# Patient Record
Sex: FEMALE | Race: WHITE
[De-identification: names, ages, dates, MRNs, and addresses within clinical notes are randomized per-mention and may not be internally consistent; named-entity substitution may affect disease eponyms.]

---

## 2019-03-06 ENCOUNTER — HOSPITAL ENCOUNTER (INPATIENT)
Dept: HOSPITAL 74 - FM/S | Age: 64
LOS: 2 days | Discharge: HOME HEALTH | DRG: 470 | End: 2019-03-08
Attending: ORTHOPAEDIC SURGERY | Admitting: ORTHOPAEDIC SURGERY
Payer: COMMERCIAL

## 2019-03-06 VITALS — BODY MASS INDEX: 35.6 KG/M2

## 2019-03-06 DIAGNOSIS — M17.11: Primary | ICD-10-CM

## 2019-03-06 DIAGNOSIS — M21.161: ICD-10-CM

## 2019-03-06 DIAGNOSIS — G47.33: ICD-10-CM

## 2019-03-06 DIAGNOSIS — M06.9: ICD-10-CM

## 2019-03-06 PROCEDURE — 0SRC0J9 REPLACEMENT OF RIGHT KNEE JOINT WITH SYNTHETIC SUBSTITUTE, CEMENTED, OPEN APPROACH: ICD-10-PCS | Performed by: ORTHOPAEDIC SURGERY

## 2019-03-06 RX ADMIN — ACETAMINOPHEN SCH MG: 325 TABLET ORAL at 11:45

## 2019-03-06 RX ADMIN — OXYCODONE HYDROCHLORIDE AND ACETAMINOPHEN SCH MG: 500 TABLET ORAL at 21:35

## 2019-03-06 RX ADMIN — ASPIRIN 325 MG ORAL TABLET SCH MG: 325 PILL ORAL at 21:35

## 2019-03-06 RX ADMIN — DOCUSATE SODIUM,SENNOSIDES SCH TABLET: 50; 8.6 TABLET, FILM COATED ORAL at 21:35

## 2019-03-06 RX ADMIN — OXYCODONE HYDROCHLORIDE SCH MG: 10 TABLET, FILM COATED, EXTENDED RELEASE ORAL at 21:35

## 2019-03-06 RX ADMIN — ACETAMINOPHEN SCH MG: 325 TABLET ORAL at 18:05

## 2019-03-06 RX ADMIN — ACETAMINOPHEN SCH: 325 TABLET ORAL at 17:26

## 2019-03-06 RX ADMIN — CEFAZOLIN SODIUM SCH MLS/HR: 2 SOLUTION INTRAVENOUS at 18:05

## 2019-03-06 NOTE — SURG
Surgery First Assist Note


First Assist: Darian Bella PA-C


Date of Service: 03/06/19


Diagnosis: 





Right knee DJD


Procedure: 





Right total knee arthroplasty


I was present for the entirety of the operative procedure. For further detail, 

please refer to operative report.








Visit type





- Case Type


Case Type: Scheduled





- New patient


This patient is new to me today: Yes


Date on this admission: 03/06/19

## 2019-03-06 NOTE — CONSULT
Consultation: 


REQUESTING PROVIDER: Dr. Landis





CONSULT REQUEST: We have been asked to medically evaluate this patient post-

operatively.





HISTORY OF PRESENT ILLNESS:


64 year-old female with a PMH significant for ROSELIA and RA not on medications; s/

p right knee arthroplasty earlier today with Dr. Landis.








REVIEW OF SYSTEMS:


CONSTITUTIONAL: 


Absent:  fever, chills, diaphoresis, generalized weakness, malaise, loss of 

appetite, weight change


HEENT: 


Absent:  rhinorrhea, nasal congestion, throat pain, throat swelling, difficulty 

swallowing, mouth swelling, ear pain, eye pain, visual changes


CARDIOVASCULAR: 


Absent: chest pain, syncope, palpitations, irregular heart rate, lightheadedness

, peripheral edema


RESPIRATORY: 


Absent: cough, shortness of breath, dyspnea with exertion, orthopnea, wheezing, 

stridor, hemoptysis


GASTROINTESTINAL:


Absent: abdominal pain, abdominal distension, nausea, vomiting, diarrhea, 

constipation, melena, hematochezia


GENITOURINARY: 


Absent: dysuria, frequency, urgency, hesitancy, hematuria, flank pain, genital 

pain


MUSCULOSKELETAL: 


+right knee pain


Absent: myalgia, arthralgia, joint swelling, back pain, neck pain


SKIN: 


Absent: rash, itching, pallor


HEMATOLOGIC/IMMUNOLOGIC: 


Absent: easy bleeding, easy bruising, lymphadenopathy, frequent infections


ENDOCRINE:


Absent: unexplained weight gain, unexplained weight loss, heat intolerance, 

cold intolerance


NEUROLOGIC: 


Absent: headache, focal weakness or paresthesias, dizziness, unsteady gait, 

seizure, mental status changes, bladder or bowel incontinence


PSYCHIATRIC: 


Absent: anxiety, depression, suicidal or homicidal ideation, hallucinations.





PHYSICAL EXAMINATION





 Vital Signs - 24 hr











  03/06/19 03/06/19 03/06/19





  06:49 10:53 11:00


 


Temperature 98 F 98 F 


 


Pulse Rate 74 75 76


 


Respiratory 18 16 17





Rate   


 


Blood Pressure 143/85 133/72 138/82


 


O2 Sat by Pulse   98





Oximetry (%)   














GENERAL: Awake, alert, and fully oriented, in no acute distress.


HEAD: Normal with no signs of trauma.


LUNGS: Breath sounds equal, clear to auscultation bilaterally. No wheezes, and 

no crackles. No accessory muscle use.


HEART: Regular rate and rhythm, normal S1 and S2 without murmur, rub or gallop.


ABDOMEN: Soft, nontender, not distended


UPPER EXTREMITIES: 2+ pulses, warm, well-perfused. No cyanosis. No clubbing. 

Cap refill <2 seconds. No peripheral edema.


LOWER EXTREMITIES: SCDs, TEDs, surgical dressing c/d/i, Hemovac drain; +flex/+

ext toes bilaterally, sensory intact


NEUROLOGICAL:  Cranial nerves II-XII intact. Normal speech.








                           Active Medications











Generic Name Dose Route Start Last Admin





  Trade Name Freq  PRN Reason Stop Dose Admin


 


Acetaminophen  650 mg  03/06/19 18:00  





  Tylenol -  PO  03/09/19 10:14  





  Q6H AARON   





     





     





     





     


 


Al Hydroxide/Mg Hydroxide  30 ml  03/06/19 10:48  





  Mylanta Oral Suspension -  PO   





  Q4H PRN   





  DYSPEPSIA   





     





     





     


 


Ascorbic Acid  500 mg  03/06/19 22:00  





  Vitamin C -  PO   





  BID [facility]   





     





     





     





     


 


Aspirin  325 mg  03/06/19 22:00  





  Asa -  PO   





  BID [facility]   





     





     





     





     


 


Cholecalciferol  2,000 unit  03/07/19 10:00  





  Vitamin D3 -  PO   





  DAILY [facility]   





     





     





     





     


 


Fentanyl  50 mcg  03/06/19 10:04  





  Sublimaze Injection -  IVPUSH   





  [id number] PRN   





  PAIN-PACU ORDER X 4 DOSES ONLY   





     





     





     


 


Lactated Ringer's  1,000 mls @ 125 mls/hr  03/06/19 10:15  





  Lactated Ringers Solution  IV   





  ASDIR AARON   





     





     





     





     


 


Cefazolin Sodium/Dextrose  2 gm in 50 mls @ 100 mls/hr  03/06/19 18:00  





  Ancef 2 Gm Premixed Ivpb -  IVPB  03/07/19 02:29  





  Q8H-IV AARON   





     





     





     





     


 


Lactated Ringer's  1,000 mls @ 125 mls/hr  03/06/19 11:00  





  Lactated Ringers Solution  IV  03/07/19 06:00  





  ASDIR AARON   





     





     





     





     


 


Magnesium Hydroxide  30 ml  03/06/19 10:48  





  Milk Of Magnesia -  PO   





  PRN PRN   





  CONSTIPATION   





     





     





     


 


Multivitamins/Minerals/Vitamin C  1 tab  03/07/19 10:00  





  Tab-A-Vit -  PO   





  DAILY [facility]   





     





     





     





     


 


Non-Formulary Medication  1 each  03/06/19 10:47  





  Ibuprofen/Famotidine [Duexis 800-26.6 Mg Tablet]  PO   





  ASDIR PRN   





  PAIN   





     





     





     


 


Ondansetron HCl  4 mg  03/06/19 10:48  





  Zofran Injection  IVPUSH   





  Q6H PRN   





  NAUSEA   





     





     





     


 


Oxycodone HCl  5 mg  03/06/19 10:04  





  Roxicodone -  PO   





  Q3H PRN   





  PAIN LEVEL 1-5   





     





     





     


 


Oxycodone HCl  10 mg  03/06/19 10:04  





  Roxicodone -  PO   





  Q3H PRN   





  PAIN LEVEL 6-10   





     





     





     


 


Oxycodone HCl  10 mg  03/06/19 22:00  





  Oxycontin -  PO  03/09/19 10:05  





  BID AARON   





     





     





     





     


 


Pantoprazole Sodium  40 mg  03/07/19 10:00  





  Protonix -  PO   





  DAILY [facility]ON   





     





     





     





     


 


Promethazine HCl  12.5 mg  03/06/19 10:04  





  Phenergan Injection -  IVPUSH   





  Q6H PRN   





  NAUSEA-FOR RESCUE AFTER 15 MIN   





     





     





     


 


Senna/Docusate Sodium  2 tablet  03/06/19 22:00  





  Pericolace -  PO   





  BID [facility]   





     





     





     





     











ASSESSMENT/PLAN:


64 year-old female with a PMH significant for ROSELIA and RA. s/p total right knee 

replacement.





Right knee arthroplasty


                --POD #0


 --perioperative antibiotics per surgery


 --pain management per surgery


 --ASA 325mg BID


 --protonix 


 --bowel regimen


 --incentive spirometry


 --Hemovac drain, monitor output





Rheumatoid arthritis


   --contrary to indication in paper chart, patient is not on methotrexate; on 

no meds





ROSELIA


   --uses home CPAP


   --respiratory to set up for tonight





FEN


 Fluids: LR@125mL/hr


 Electrolytes: replete as indicated


 Nutrition: regular diet





DVT prophylaxis: OOB, ambulation, SCDs, TEDs, ASA 325mg BID





Physical therapy





Dispo: We will continue to follow the patient. Thank you for this consultative 

opportunity.











Visit type





- Emergency Visit


Emergency Visit: Yes


ED Registration Date: 03/06/19


Care time: The patient presented to the Emergency Department on the above date 

and was hospitalized for further evaluation of their emergent condition.





- New Patient


This patient is new to me today: Yes


Date on this admission: 03/06/19





- Critical Care


Critical Care patient: No

## 2019-03-07 LAB
ANION GAP SERPL CALC-SCNC: 8 MMOL/L (ref 8–16)
BUN SERPL-MCNC: 17 MG/DL (ref 7–18)
CALCIUM SERPL-MCNC: 8.7 MG/DL (ref 8.5–10)
CHLORIDE SERPL-SCNC: 102 MMOL/L (ref 98–107)
CO2 SERPL-SCNC: 25 MMOL/L (ref 21–32)
CREAT SERPL-MCNC: 0.7 MG/DL (ref 0.55–1.3)
DEPRECATED RDW RBC AUTO: 12.5 % (ref 11.6–15.6)
GLUCOSE SERPL-MCNC: 119 MG/DL (ref 74–106)
HCT VFR BLD CALC: 34.8 % (ref 32.4–45.2)
HGB BLD-MCNC: 11.7 GM/DL (ref 10.7–15.3)
MCH RBC QN AUTO: 30.3 PG (ref 25.7–33.7)
MCHC RBC AUTO-ENTMCNC: 33.7 G/DL (ref 32–36)
MCV RBC: 90 FL (ref 80–96)
PLATELET # BLD AUTO: 243 K/MM3 (ref 134–434)
PMV BLD: 7.8 FL (ref 7.5–11.1)
POTASSIUM SERPLBLD-SCNC: 3.9 MMOL/L (ref 3.5–5.1)
RBC # BLD AUTO: 3.87 M/MM3 (ref 3.6–5.2)
SODIUM SERPL-SCNC: 135 MMOL/L (ref 136–145)
WBC # BLD AUTO: 8.9 K/MM3 (ref 4–10.8)

## 2019-03-07 RX ADMIN — PANTOPRAZOLE SODIUM SCH MG: 40 TABLET, DELAYED RELEASE ORAL at 09:36

## 2019-03-07 RX ADMIN — ACETAMINOPHEN SCH MG: 325 TABLET ORAL at 00:00

## 2019-03-07 RX ADMIN — MULTIVITAMIN TABLET SCH TAB: TABLET at 09:36

## 2019-03-07 RX ADMIN — OXYCODONE HYDROCHLORIDE SCH MG: 10 TABLET, FILM COATED, EXTENDED RELEASE ORAL at 21:11

## 2019-03-07 RX ADMIN — ACETAMINOPHEN SCH MG: 325 TABLET ORAL at 19:04

## 2019-03-07 RX ADMIN — OXYCODONE HYDROCHLORIDE AND ACETAMINOPHEN SCH MG: 500 TABLET ORAL at 21:11

## 2019-03-07 RX ADMIN — ASPIRIN 325 MG ORAL TABLET SCH MG: 325 PILL ORAL at 21:11

## 2019-03-07 RX ADMIN — DOCUSATE SODIUM,SENNOSIDES SCH TABLET: 50; 8.6 TABLET, FILM COATED ORAL at 21:12

## 2019-03-07 RX ADMIN — DOCUSATE SODIUM,SENNOSIDES SCH TABLET: 50; 8.6 TABLET, FILM COATED ORAL at 09:35

## 2019-03-07 RX ADMIN — CEFAZOLIN SODIUM SCH MLS/HR: 2 SOLUTION INTRAVENOUS at 02:00

## 2019-03-07 RX ADMIN — ASPIRIN 325 MG ORAL TABLET SCH MG: 325 PILL ORAL at 09:35

## 2019-03-07 RX ADMIN — ACETAMINOPHEN SCH MG: 325 TABLET ORAL at 12:00

## 2019-03-07 RX ADMIN — VITAMIN D, TAB 1000IU (100/BT) SCH UNIT: 25 TAB at 09:36

## 2019-03-07 RX ADMIN — OXYCODONE HYDROCHLORIDE SCH MG: 10 TABLET, FILM COATED, EXTENDED RELEASE ORAL at 09:35

## 2019-03-07 RX ADMIN — OXYCODONE HYDROCHLORIDE AND ACETAMINOPHEN SCH MG: 500 TABLET ORAL at 09:36

## 2019-03-07 RX ADMIN — ACETAMINOPHEN SCH MG: 325 TABLET ORAL at 06:32

## 2019-03-07 NOTE — PN
Progress Note (short form)





- Note


Progress Note: 





POD#1





Pt without any nausea, emesis. No CP/SOB. Had some pain overnight relieved with 

medications. 





 Vital Signs











 Period  Temp  Pulse  Resp  BP Sys/Jain  Pulse Ox


 


 Last 24 Hr  97.4 F-98.4 F  68-86  16-18  114-138/55-86  93-98








Hemovac:265 bloody





GEN: A&0x3, NAD. OOB to chair.


CV: RRR


Lungs: CTA b/l anterioly


Right knee: dressing/ace wrap c/d/i. 4/5 dorsi/plantar flexion


Left le/5/ dorsi/plantar flexion. SCD in place no calf tenderness or 

swelling noted.





CBC/CHEM pending





A/p: 65 yo female s/p Right TKR, POD #1


   Pain management and mobilization today. OOB with assistance PT


   Diet as tolerated


   DVT ppx with SCD/mobilization/Aspirin 325mg BID


   Pian managment with oxycontin/oxycodone/tylenol


   Stool softners for constipation


   D/w Dr. Landis

## 2019-03-07 NOTE — PN
Physical Exam: 


SUBJECTIVE: Patient seen and examined at bedside. Just back from PT, feels it 

went well. Pain is well-managed.








OBJECTIVE:





 Vital Signs











 Period  Temp  Pulse  Resp  BP Sys/Jain  Pulse Ox


 


 Last 24 Hr  97.4 F-98.4 F  68-86  16-18  114-138/55-86  93-98











GENERAL: Awake, alert, and fully oriented, in no acute distress.


HEAD: Normal with no signs of trauma.


LUNGS: Breath sounds equal, clear to auscultation bilaterally. No wheezes, and 

no crackles. No accessory muscle use.


HEART: Regular rate and rhythm, normal S1 and S2 without murmur, rub or gallop.


ABDOMEN: Soft, nontender, not distended


UPPER EXTREMITIES: 2+ pulses, warm, well-perfused. No cyanosis. No clubbing. 

Cap refill <2 seconds. No peripheral edema.


LOWER EXTREMITIES: SCDs, TEDs, surgical dressing c/d/i, Hemovac drain; +flex/+

ext toes bilaterally, sensory intact


NEUROLOGICAL:  Cranial nerves II-XII intact. Normal speech.














                           Active Medications











Generic Name Dose Route Start Last Admin





  Trade Name Freq  PRN Reason Stop Dose Admin


 


Acetaminophen  650 mg  03/06/19 18:00  03/07/19 06:32





  Tylenol -  PO  03/09/19 10:14  650 mg





  Q6H AARON   Administration





     





     





     





     


 


Al Hydroxide/Mg Hydroxide  30 ml  03/06/19 10:48  





  Mylanta Oral Suspension -  PO   





  Q4H PRN   





  DYSPEPSIA   





     





     





     


 


Ascorbic Acid  500 mg  03/06/19 22:00  03/06/19 21:35





  Vitamin C -  PO   500 mg





  BID AARON   Administration





     





     





     





     


 


Aspirin  325 mg  03/06/19 22:00  03/06/19 21:35





  Asa -  PO   325 mg





  BID AARON   Administration





     





     





     





     


 


Cholecalciferol  2,000 unit  03/07/19 10:00  





  Vitamin D3 -  PO   





  DAILY [facility]ON   





     





     





     





     


 


Fentanyl  50 mcg  03/06/19 10:04  





  Sublimaze Injection -  IVPUSH   





  [id number] PRN   





  PAIN-PACU ORDER X 4 DOSES ONLY   





     





     





     


 


Lactated Ringer's  1,000 mls @ 125 mls/hr  03/06/19 10:15  03/06/19 17:26





  Lactated Ringers Solution  IV   Not Given





  ASDIR AARON   





     





     





     





     


 


Magnesium Hydroxide  30 ml  03/06/19 10:48  





  Milk Of Magnesia -  PO   





  PRN PRN   





  CONSTIPATION   





     





     





     


 


Multivitamins/Minerals/Vitamin C  1 tab  03/07/19 10:00  





  Tab-A-Vit -  PO   





  DAILY [facility]ON   





     





     





     





     


 


Non-Formulary Medication  1 each  03/06/19 10:47  





  Ibuprofen/Famotidine [Duexis 800-26.6 Mg Tablet]  PO   





  ASDIR PRN   





  PAIN   





     





     





     


 


Ondansetron HCl  4 mg  03/06/19 10:48  





  Zofran Injection  IVPUSH   





  Q6H PRN   





  NAUSEA   





     





     





     


 


Oxycodone HCl  5 mg  03/06/19 10:04  





  Roxicodone -  PO   





  Q3H PRN   





  PAIN LEVEL 1-5   





     





     





     


 


Oxycodone HCl  10 mg  03/06/19 10:04  03/07/19 05:12





  Roxicodone -  PO   10 mg





  Q3H PRN   Administration





  PAIN LEVEL 6-10   





     





     





     


 


Oxycodone HCl  10 mg  03/06/19 22:00  03/06/19 21:35





  Oxycontin -  PO  03/09/19 10:05  10 mg





  BID AARON   Administration





     





     





     





     


 


Pantoprazole Sodium  40 mg  03/07/19 10:00  





  Protonix -  PO   





  DAILY [facility]ARON   





     





     





     





     


 


Promethazine HCl  12.5 mg  03/06/19 10:04  





  Phenergan Injection -  IVPUSH   





  Q6H PRN   





  NAUSEA-FOR RESCUE AFTER 15 MIN   





     





     





     


 


Senna/Docusate Sodium  2 tablet  03/06/19 22:00  03/06/19 21:35





  Pericolace -  PO   2 tablet





  BID AARON   Administration





     





     





     





     











ASSESSMENT/PLAN:


64 year-old female with a PMH significant for ROSELIA and RA. s/p total right knee 

replacement.





Right knee arthroplasty


                --POD #1


 --perioperative antibiotics complete


 --pain management per surgery


 --ASA 325mg BID


 --protonix 


 --bowel regimen


 --incentive spirometry


 --Hemovac drain, monitor output





Rheumatoid arthritis


   --contrary to indication in paper chart, patient is not on methotrexate; on 

no meds





ROSELIA


   --CPAP at night


   


FEN


 Fluids: PO intake adequate


 Electrolytes: replete as indicated


 Nutrition: regular diet





DVT prophylaxis: OOB, ambulation, SCDs, TEDs, ASA 325mg BID





Physical therapy





Dispo: We will continue to follow the patient. Thank you for this consultative 

opportunity.








Visit type





- Emergency Visit


Emergency Visit: No





- New Patient


This patient is new to me today: Yes


Date on this admission: 03/07/19





- Critical Care


Critical Care patient: No

## 2019-03-07 NOTE — OP
DATE OF OPERATION:  03/06/2019

 

SURGEON:  Sherwin Landis M.D. 

 

ASSISTANT:  MINDA Boyd 

 

PREOPERATIVE DIAGNOSIS:  Right tricompartmental osteoarthritis knee.  With fixed

flexion deformity by 5 degrees and fixed varus deformity of 20 degrees.

 

POSTOPERATIVE DIAGNOSIS:  Right tricompartmental osteoarthritis knee.  With fixed

flexion deformity by 5 degrees and fixed varus deformity of 20 degrees.

 

OPERATION PERFORMED:  Right posterior stabilized total knee arthroplasty ([place]).  

 

ANESTHESIA:  Conscious sedation.  Spinal anesthesia with peripheral nerve block.  

 

OPERATION IN DETAILS:  The patient was correctly identified, brought in operating

room.  Timeout was called.  2 g Kefzol, 1 g vancomycin given preoperatively.  Patient

in the supine position, the entire limb free draped.  Midline incision was utilized. 

Incision was created and the quadriceps tendon was split longitudinally.  A medial

parapatellar incision was made down to the medial aspect of the tibial tubercle.  The

patella was first cut from patella ligament to quadriceps tendon in accordance with

[place]'s line, and the jig size setting was for a 27-mm patella button.  The soft

tissue envelope was dissected sharply off the proximal medial tibia.  A blunt hammer

was placed behind the tibia.  The tibia cut was made neutral and 90 degrees to the

tibial shaft.  It must be noted, this was difficult, because the actual tibia itself

was bowed and this called for clinical judgment where to seat the jig.  The femoral

jig was uncomplicated.  This utilized the appropriate jig settings for 10 mm distal

femoral cut, 2 mm proximalization of the joint line. The jig was set to 3 degrees of

external rotation and 4 degrees of valgus.  Sizing of the components was a Triathlon

posterior stabilized baseplate size 5 that was for the tibia.  The femur measured

size 4, and the flexion, extension gaps were even at 13 mm, so the ultimate

polyethylene utilized was a 5 x 13 mm component.  The jig cuts were made in

accordance to the principles as outlined above.  The femoral cut was made with the

appropriate jig systems, and the flexion and  extension gaps were measured once the

femoral and tibial cuts were made.  Trialing components revealed excellent

positioning of those respective sizes, bringing the knee into full extension and full

range of movement with no patella subluxation and thus normal tracking.  So complete

stability at 13 mm until the coronal sagittal plane was identified.  Once this had

been completed, the cuts were all made appropriately.  The cementing was one stage to

receive the above mentioned sizes.  This included the patella.  All extraneous cement

was removed.  Closure quadriceps tendon fascia 1 Vicryl, subcutaneous 1 and 2-0

Vicryl, skin 3-0 Monocryl with Steri-Strips, drainage one-eighth inch x1.  Prior to

the actual closure, the wounds were thoroughly lavaged, and all extraneous cement had

been removed.   

 

 

MD GEORGINA Amador/[id number]

DD: 03/06/2019 18:03

DT: 03/07/2019 00:41

Job #:  [id number]

## 2019-03-08 VITALS — TEMPERATURE: 98.1 F | HEART RATE: 95 BPM | SYSTOLIC BLOOD PRESSURE: 152 MMHG | DIASTOLIC BLOOD PRESSURE: 74 MMHG

## 2019-03-08 LAB
DEPRECATED RDW RBC AUTO: 12.6 % (ref 11.6–15.6)
HCT VFR BLD CALC: 33 % (ref 32.4–45.2)
HGB BLD-MCNC: 11 GM/DL (ref 10.7–15.3)
MCH RBC QN AUTO: 29.9 PG (ref 25.7–33.7)
MCHC RBC AUTO-ENTMCNC: 33.4 G/DL (ref 32–36)
MCV RBC: 89.7 FL (ref 80–96)
PLATELET # BLD AUTO: 206 K/MM3 (ref 134–434)
PMV BLD: 7.8 FL (ref 7.5–11.1)
RBC # BLD AUTO: 3.68 M/MM3 (ref 3.6–5.2)
WBC # BLD AUTO: 9.2 K/MM3 (ref 4–10.8)

## 2019-03-08 RX ADMIN — ASPIRIN 325 MG ORAL TABLET SCH MG: 325 PILL ORAL at 10:06

## 2019-03-08 RX ADMIN — PANTOPRAZOLE SODIUM SCH MG: 40 TABLET, DELAYED RELEASE ORAL at 10:06

## 2019-03-08 RX ADMIN — VITAMIN D, TAB 1000IU (100/BT) SCH UNIT: 25 TAB at 10:06

## 2019-03-08 RX ADMIN — ACETAMINOPHEN SCH: 325 TABLET ORAL at 00:31

## 2019-03-08 RX ADMIN — OXYCODONE HYDROCHLORIDE AND ACETAMINOPHEN SCH MG: 500 TABLET ORAL at 10:07

## 2019-03-08 RX ADMIN — MULTIVITAMIN TABLET SCH TAB: TABLET at 10:06

## 2019-03-08 RX ADMIN — OXYCODONE HYDROCHLORIDE SCH MG: 10 TABLET, FILM COATED, EXTENDED RELEASE ORAL at 10:07

## 2019-03-08 RX ADMIN — ACETAMINOPHEN SCH MG: 325 TABLET ORAL at 06:41

## 2019-03-08 RX ADMIN — DOCUSATE SODIUM,SENNOSIDES SCH TABLET: 50; 8.6 TABLET, FILM COATED ORAL at 10:07

## 2019-03-08 RX ADMIN — ACETAMINOPHEN SCH MG: 325 TABLET ORAL at 15:14

## 2019-03-08 NOTE — DS
Physical Exam: 


SUBJECTIVE:Patient seen and examined s/p right TKR patient seen and examined at 

bedside with no complaints. Patient states she has been OOB and ambulating with 

assistance and walker. She is tolerating her diet and voiding. She denies any CP

, SOB, N/V/D Fever or chills.








OBJECTIVE:


 Vital Signs











Temp  98.1 F   03/08/19 09:39


 


Pulse  95 H  03/08/19 09:39


 


Resp  18   03/08/19 09:39


 


BP  152/74   03/08/19 09:39


 


Pulse Ox  95   03/08/19 09:00








 Intake & Output











 03/07/19 03/07/19 03/08/19





 11:59 23:59 11:59


 


Intake Total  500 


 


Output Total 160 5 5


 


Balance -160 495 -5


 


Intake:   


 


  Oral  500 


 


Output:   


 


  Drainage 160 5 5


 


    Right Knee 160 5 5


 


Other:   


 


  Voiding Method Toilet Toilet Toilet

















 


PHYSICAL EXAM





GENERAL: The patient is awake, alert, and fully oriented, in no acute distress.


HEAD: Normal with no signs of trauma.


EYES: sclera anicteric, conjunctiva clear. 


NECK: Trachea midline, 


LUNGS:  no auditory  wheezes, no accessory muscle use on RA 


EXTREMITIES:RE: thigh soft and supple with dressing, c/d/i, Hemavac drain 

removed with tip fully intact site clean and dry. diffuse edema throughout 

appropriate to status, ice in place.  R DF 4+/5, PF 4+/5 (2/2 pain blocking) 

ROM form 0-60 degrees actively.  LLE 5/5 df/pf, b/l le compartments soft supple 

and non-tender to palpation, +DP pulses, warm, well-perfused.


NEUROLOGICAL: Cranial nerves II through XII grossly intact. Normal speech, gait 

not observed.


PSYCH: Normal mood, normal affect.


SKIN: Warm, dry, normal turgor, no rashes or lesions noted.








LABS


 CBC, BMP





 03/08/19 07:31 





 03/07/19 07:15 








 


HOSPITAL COURSE:





Date of Admission:03/06/19





The patient was admitted to the Med-Surg Unit after an elective repair of their 

right knee OA. Now, s/p right TKA. An xray was obtained in the OR and confirmed 

hardware placement in good position with no fractures or dislocations.


The day of surgery, the patient ambulated the hallways with assistance. 

Narcotic and non-narcotic pain management control was achieved with an oral and 

IV approach. POD #2 the surgical drain was removed with tip fully intact. 


Carley-operative IV ABX were administered. DVT prophylaxis was achieved with SCDs

, aspirin and early ambulation.


The patient ambulated with Physical Therapy and no services were recommended 

upon discharge. Narcotic scripts and or muscle relaxants were checked with [place] 

 prior to escibe. The discharge instructions and an oral pain management 

plan were reviewed with the patient. All questions answered. 


Above plan discussed with Dr. Landis and agreed.








Date of Discharge: 03/08/19





Minutes to complete discharge: 25





Discharge Summary


Reason For Visit: RIGHT KNEE OSTEOARTHRITIS


Condition: Stable





- Instructions


Diet, Activity, Other Instructions: 


Dr. Landis Discharge Instructions for Knee Replacement





Post Operative Instructions





Physical activity


Physical Therapist will come to your home for the first 5 days. You will be set 

up with outpatient PT at your first post-operative visit. Use assistive devices 

for ambulation at all times. Weight bearing as tolerated on your surgical side. 

Do not put pillow under knee. May put pillow under heel.





Wound care


Leave your surgical dressing in place. Do not change the dressing until seen by 

your surgeon in the office. No baths or showers. Do not submerge your incision. 

Do not apply any ointments or lotions to your incision. Please call the office 

if your dressing is soiled/dirty or is falling off. Apply Graduated Compression 

Stockings (TEDS) to both lower extremities - remove daily for hygiene ONLY. 





Diet


There are no dietary restrictions. Eat healthy, high-fiber foods. Drink 6 to 8 

glasses of liquid each day. This will assist in keeping your bowels are regular.





Pain management


Any pain prescription medication ordered should be taken as prescribed for 

moderate to severe pain. Do not take additional Tylenol while taking Percocet.





Take Aspirin 81 mg two times a day for a total of 6 weeks to prevent blood 

clots.





Call Dr. Landis for any of the following:


Severe pain not relieved by medication


Fever of 101 or higher


Excessive bleeding or drainage on dressing


Inability to urinate


If you experience chest pain or shortness of breath, please seek emergency care 

immediately.





Please call the office at [phone] to confirm your post-op appointment 

for the week following surgery.








[place] 


Checked prior to escribe of narcotics for pain management.


This report was requested by: Darian Bella | Reference #: [id number]   


03/01/2019   Hydrocodone-acetaminophen  mg tablet / 60 tablets / Kylee Munguia PA-C


Disposition: HOME





- Home Medications


Comprehensive Discharge Medication List: 


Ambulatory Orders





Esomeprazole Magnesium [Nexium 24Hr] 40 mg PO DAILY 02/20/19 


Hydrocodone/Acetaminophen [Hydrocodone-Acetamin  mg] 1 each PO ASDIR PRN 

02/20/19 


Ibuprofen/Famotidine [Duexis 800-26.6 mg Tablet] 1 each PO ASDIR PRN 02/20/19 


Rutin/Hesp/Bioflav/[id number] [Bioflex Tablet] 1 each PO DAILY 02/20/19 


Aspirin [Aspirin EC] 81 mg PO DAILY 03/06/19 


Cholecalciferol (Vitamin D3) [Vitamin D] 2,000 unit PO DAILY 03/06/19 











Problem List





- Problems


(1) S/P total knee arthroplasty


Assessment/Plan: 


POD#2 Right TKA doikng well, would like to go home with family and VNS.








-D/c to home with VNS today.


- Pain control as ordered


-DVT PPx:


   -Chemical: ASA 81 mg po BID x 6 weeks


   -Mechanical: BUBBA's, SCD's


-Incentive Spirometry.


-PT/Rehab, OOB.


-WBAT LLE


- F/u with Dr Landis as scheduled- call office to confirm. 


Code(s): Z96.659 - PRESENCE OF UNSPECIFIED ARTIFICIAL KNEE JOINT   


This patient is new to me today: Yes


Date on this admission: 03/08/19


Emergency Visit: No


Critical Care patient: No





- Discharge Referral


Referred to [facility] No

## 2019-03-08 NOTE — PATH
Surgical Pathology Report



Patient Name:  CATIE TIJERINA

Accession #:  

Med. Rec. #:  [id number]                                                        

   /Age/Gender:  1955 (Age: 64) / F

Account:  [id number]                                                          

             Location: [facility]

Taken:  3/6/2019

Received:  3/6/2019

Reported:  3/8/2019

Physicians:  Sherwin Landis M.D.

  



Specimen(s) Received

 BONES RIGHT KNEE 





Clinical History

Right knee osteoarthritis







Final Diagnosis

BONES, KNEE, RIGHT, TOTAL KNEE REPLACEMENT:

BONE WITH DEGENERATIVE JOINT DISEASE AND SYNOVIUM.





***Electronically Signed***

Liss Matthews M.D.





Gross Description

Received in formalin labeled "bones right knee," is a 12.5 x 10.0 x 2.0 cm

aggregate of multiple portions of bone and soft tissue, consistent with knee

bones. The tibial plateau measures 7.7 x 5.5 x 2.0 cm. There are multiple areas

of eburnation identified, measuring up to 2.6 cm in greatest dimension. The

underlying trabecular bone is yellow and hard. Representative sections are

submitted in one cassette, following decalcification.

/3/7/2019



[place]/3/7/2019

## 2019-03-11 ENCOUNTER — HOSPITAL ENCOUNTER (OUTPATIENT)
Dept: HOSPITAL 74 - FER | Age: 64
Setting detail: OBSERVATION
LOS: 1 days | Discharge: HOME | End: 2019-03-12
Attending: NURSE PRACTITIONER | Admitting: INTERNAL MEDICINE
Payer: COMMERCIAL

## 2019-03-11 VITALS — BODY MASS INDEX: 35.4 KG/M2

## 2019-03-11 DIAGNOSIS — Z91.013: ICD-10-CM

## 2019-03-11 DIAGNOSIS — G47.33: ICD-10-CM

## 2019-03-11 DIAGNOSIS — Z96.651: ICD-10-CM

## 2019-03-11 DIAGNOSIS — Z99.89: ICD-10-CM

## 2019-03-11 DIAGNOSIS — M06.9: ICD-10-CM

## 2019-03-11 DIAGNOSIS — Z79.82: ICD-10-CM

## 2019-03-11 DIAGNOSIS — K21.9: ICD-10-CM

## 2019-03-11 DIAGNOSIS — L03.115: Primary | ICD-10-CM

## 2019-03-11 DIAGNOSIS — E66.9: ICD-10-CM

## 2019-03-11 LAB
ALBUMIN SERPL-MCNC: 3.3 G/DL (ref 3.4–5)
ALP SERPL-CCNC: 77 U/L (ref 45–117)
ALT SERPL-CCNC: 21 U/L (ref 13–61)
ANION GAP SERPL CALC-SCNC: 11 MMOL/L (ref 8–16)
APTT BLD: 26.7 SECONDS (ref 25.2–36.5)
AST SERPL-CCNC: 24 U/L (ref 15–37)
BACTERIA #/AREA URNS HPF: (no result) /HPF
BASOPHILS # BLD: 2.8 % (ref 0–2)
BILIRUB SERPL-MCNC: 0.9 MG/DL (ref 0.2–1)
BUN SERPL-MCNC: 9 MG/DL (ref 7–18)
CALCIUM SERPL-MCNC: 8.6 MG/DL (ref 8.5–10)
CHLORIDE SERPL-SCNC: 99 MMOL/L (ref 98–107)
CO2 SERPL-SCNC: 25 MMOL/L (ref 21–32)
CREAT SERPL-MCNC: 0.6 MG/DL (ref 0.55–1.3)
DEPRECATED RDW RBC AUTO: 12.4 % (ref 11.6–15.6)
EOSINOPHIL # BLD: 2.9 % (ref 0–4.5)
EPITH CASTS URNS QL MICRO: (no result) /HPF
GLUCOSE SERPL-MCNC: 99 MG/DL (ref 74–106)
HCT VFR BLD CALC: 33.9 % (ref 32.4–45.2)
HGB BLD-MCNC: 11.2 GM/DL (ref 10.7–15.3)
INR BLD: 1.18 (ref 0.82–1.09)
LYMPHOCYTES # BLD: 19.3 % (ref 8–40)
MCH RBC QN AUTO: 29.5 PG (ref 25.7–33.7)
MCHC RBC AUTO-ENTMCNC: 33 G/DL (ref 32–36)
MCV RBC: 89.2 FL (ref 80–96)
MONOCYTES # BLD AUTO: 9.1 % (ref 3.8–10.2)
NEUTROPHILS # BLD: 65.9 % (ref 42.8–82.8)
PH UR: 8 [PH] (ref 4.5–8)
PLATELET # BLD AUTO: 375 K/MM3 (ref 134–434)
PMV BLD: 7.2 FL (ref 7.5–11.1)
POTASSIUM SERPLBLD-SCNC: 4.4 MMOL/L (ref 3.5–5.1)
PROT SERPL-MCNC: 6.6 G/DL (ref 6.4–8.2)
PT PNL PPP: 13.2 SEC (ref 10.2–13)
RBC # BLD AUTO: (no result) /HPF (ref 0–3)
RBC # BLD AUTO: 3.8 M/MM3 (ref 3.6–5.2)
SODIUM SERPL-SCNC: 135 MMOL/L (ref 136–145)
SP GR UR: 1.01 (ref 1.01–1.03)
UROBILINOGEN UR STRIP-MCNC: 0.2 MG/DL (ref 0.2–1)
WBC # BLD AUTO: 6.7 K/MM3 (ref 4–10.8)
WBC # UR AUTO: (no result) /UL (ref 0–5)

## 2019-03-11 PROCEDURE — 3E033NZ INTRODUCTION OF ANALGESICS, HYPNOTICS, SEDATIVES INTO PERIPHERAL VEIN, PERCUTANEOUS APPROACH: ICD-10-PCS | Performed by: NURSE PRACTITIONER

## 2019-03-11 PROCEDURE — 3E013GC INTRODUCTION OF OTHER THERAPEUTIC SUBSTANCE INTO SUBCUTANEOUS TISSUE, PERCUTANEOUS APPROACH: ICD-10-PCS | Performed by: NURSE PRACTITIONER

## 2019-03-11 PROCEDURE — G0378 HOSPITAL OBSERVATION PER HR: HCPCS

## 2019-03-11 PROCEDURE — 3E03329 INTRODUCTION OF OTHER ANTI-INFECTIVE INTO PERIPHERAL VEIN, PERCUTANEOUS APPROACH: ICD-10-PCS | Performed by: NURSE PRACTITIONER

## 2019-03-11 RX ADMIN — HEPARIN SODIUM SCH UNIT: 5000 INJECTION, SOLUTION INTRAVENOUS; SUBCUTANEOUS at 23:17

## 2019-03-11 NOTE — PDOC
History of Present Illness





<Mendel Holland - Last Filed: 03/11/19 19:05>





- History of Present Illness


Initial Comments: 





03/11/19 16:52





64 years old past medical significant for sleep apnea and rheumatoid arthritis 

status post right knee replacement on Wednesday 3/8 had been feeling well up 

until yesterday when she began to develop redness and swelling to her knee and 

shortness of breath with ambulation





Pain is persistent constant worse with ambulation seen by her orthopedist 

office and sent to the ED to rule out DVT





03/11/19 17:50








<Napoleon Byrd - Last Filed: 03/12/19 17:32>





- General


Chief Complaint: Shortness of Breath


Stated Complaint: SOB. S/P R TKR


Time Seen by Provider: 03/11/19 16:21





Past History





<Mendel Holland - Last Filed: 03/11/19 19:05>





- Past Medical History


Anemia: No


Asthma: No


Cancer: No


Cardiac Disorders: No


CVA: No


COPD: No


CHF: No


Dementia: No


Diabetes: No


GI Disorders: Yes (GERD)


 Disorders: No


HTN: Yes (NOT ON MEDICATION)


Hypercholesterolemia: Yes (DIET CONTROLLED)


Liver Disease: No


Seizures: No


Thyroid Disease: No





- Surgical History


Abdominal Surgery: No


Appendectomy: No


Cardiac Surgery: No


Cholecystectomy: No


Lung Surgery: No


Neurologic Surgery: No


Orthopedic Surgery: No





- Suicide/Smoking/Psychosocial Hx


Smoking History: Never smoked


Have you smoked in the past 12 months: No


Hx Alcohol Use: No


Drug/Substance Use Hx: No


Substance Use Type: None


Hx Substance Use Treatment: No





<Napoleon Byrd - Last Filed: 03/12/19 17:32>





- Past Medical History


Allergies/Adverse Reactions: 


 Allergies











Allergy/AdvReac Type Severity Reaction Status Date / Time


 


shellfish derived Allergy Severe Rash Verified 03/11/19 16:12


 


No Known Drug Intolerances Allergy   Verified 03/11/19 16:12











Home Medications: 


Ambulatory Orders





Esomeprazole Magnesium [Nexium 24Hr] 40 mg PO DAILY 02/20/19 


Ibuprofen/Famotidine [Duexis 800-26.6 mg Tablet] 1 each PO ASDIR PRN 02/20/19 


Rutin/Hesp/Bioflav/C/[id number] [Bioflex Tablet] 1 each PO DAILY 02/20/19 


Cholecalciferol (Vitamin D3) [Vitamin D3] 2,000 unit PO DAILY 03/06/19 


Aspirin [Aspirin EC] 81 mg PO BID #84 tablet. 03/08/19 


Docusate Sodium [Colace -] 100 mg PO TID PRN #90 capsule 03/08/19 


Ferrous Sulfate 325 mg PO DAILY #30 tablet 03/08/19 


Folic Acid 1 mg PO DAILY #30 tablet 03/08/19 











**Review of Systems





- Review of Systems


Comments:: 





03/11/19 16:52


ROS:  A complete review of 10 out of 10 review of systems is taken and is 

negative apart from what is previously mentioned below and in the HPI.








<Napoleon Byrd - Last Filed: 03/12/19 17:32>





*Physical Exam





- Vital Signs


 Last Vital Signs











Temp Pulse Resp BP Pulse Ox


 


 98.5 F   88   20   148/84   98 


 


 03/11/19 16:10  03/11/19 16:10  03/11/19 16:10  03/11/19 16:10  03/11/19 16:10














<Mendel Holland - Last Filed: 03/11/19 19:05>





- Vital Signs


 Last Vital Signs











Temp Pulse Resp BP Pulse Ox


 


 98.5 F   88   20   148/84   98 


 


 03/11/19 16:10  03/11/19 16:10  03/11/19 16:10  03/11/19 16:10  03/11/19 16:10














- Physical Exam


Comments: 





03/11/19 16:53








Vitals: Triage Vital signs reviewed  


General Appearance:  no acute distress, well nourished well developed, 


Head: Atraumatic, 


Cardiac: Regular rate and rhythym, no murmurs, no rubs, no gallops, 


Lungs: Clear to auscultation bilateral, good air movement bilaterally,


Abdomen:  Soft, non distended, normal bowel sounds, non tender to palpation


Extremities: Midline knee incision status post right total knee replacement 

with some splotchy areas of redness and diffuse warmth 


Skin:  Warm, see above


Psych:  normal mood, normal affect








<Napoleon Byrd - Last Filed: 03/12/19 17:32>





Moderate Sedation





- Procedure Monitoring


Vital Signs: 


Procedure Monitoring Vital Signs











Temperature  98.5 F   03/11/19 16:10


 


Pulse Rate  88   03/11/19 16:10


 


Respiratory Rate  20   03/11/19 16:10


 


Blood Pressure  148/84   03/11/19 16:10


 


O2 Sat by Pulse Oximetry (%)  98   03/11/19 16:10











<Mendel Holland - Last Filed: 03/11/19 19:05>





- Procedure Monitoring


Vital Signs: 


Procedure Monitoring Vital Signs











Temperature  98.5 F   03/11/19 16:10


 


Pulse Rate  88   03/11/19 16:10


 


Respiratory Rate  20   03/11/19 16:10


 


Blood Pressure  148/84   03/11/19 16:10


 


O2 Sat by Pulse Oximetry (%)  98   03/11/19 16:10











<Napoleon Byrd - Last Filed: 03/12/19 17:32>





ED Treatment Course





- LABORATORY


CBC & Chemistry Diagram: 


 03/11/19 17:13





 03/11/19 17:13





- ADDITIONAL ORDERS


Additional order review: 


 Laboratory  Results











  03/11/19 03/11/19 03/11/19





  17:13 17:13 17:13


 


PT with INR    13.2 H


 


INR    1.18


 


PTT (Actin FS)    26.7


 


Sodium   135 L 


 


Potassium   4.4 


 


Chloride   99 


 


Carbon Dioxide   25 


 


Anion Gap   11 


 


BUN   9 


 


Creatinine   0.6 


 


Creat Clearance w eGFR   > 60 


 


Random Glucose   99 


 


Calcium   8.6 


 


Total Bilirubin   0.9 


 


AST   24 


 


ALT   21 


 


Alkaline Phosphatase   77 


 


Troponin I  < 0.03  


 


Total Protein   6.6 


 


Albumin   3.3 L 


 


Urine Color   


 


Urine Appearance   


 


Urine pH   


 


Ur Specific Gravity   


 


Urine Protein   


 


Urine Glucose (UA)   


 


Urine Ketones   


 


Urine Blood   


 


Urine Nitrite   


 


Urine Bilirubin   


 


Urine Urobilinogen   


 


Ur Leukocyte Esterase   














  03/11/19





  16:30


 


PT with INR 


 


INR 


 


PTT (Actin FS) 


 


Sodium 


 


Potassium 


 


Chloride 


 


Carbon Dioxide 


 


Anion Gap 


 


BUN 


 


Creatinine 


 


Creat Clearance w eGFR 


 


Random Glucose 


 


Calcium 


 


Total Bilirubin 


 


AST 


 


ALT 


 


Alkaline Phosphatase 


 


Troponin I 


 


Total Protein 


 


Albumin 


 


Urine Color  Yellow


 


Urine Appearance  Clear


 


Urine pH  8.0


 


Ur Specific Gravity  1.010


 


Urine Protein  Negative


 


Urine Glucose (UA)  Negative


 


Urine Ketones  Negative


 


Urine Blood  Trace-intact H


 


Urine Nitrite  Negative


 


Urine Bilirubin  Negative


 


Urine Urobilinogen  0.2


 


Ur Leukocyte Esterase  Negative








 











  03/11/19





  17:13


 


RBC  3.80


 


MCV  89.2


 


MCHC  33.0


 


RDW  12.4


 


MPV  7.2 L


 


Neutrophils %  65.9


 


Lymphocytes %  19.3


 


Monocytes %  9.1


 


Eosinophils %  2.9


 


Basophils %  2.8 H














<Mendel Holland - Last Filed: 03/11/19 19:05>





- LABORATORY


CBC & Chemistry Diagram: 


 03/12/19 07:10





 03/12/19 07:10





- RADIOLOGY


Radiology Studies Ordered: 














 Category Date Time Status


 


 CXRPORT [CHEST X-RAY PORTABLE*] [RAD] Stat Radiology  03/11/19 16:45 Ordered


 


 DUPLEX VASCUL US-1 LEG [US] Stat Ultrasound  03/11/19 16:43 Ordered














<Napoleon Byrd - Last Filed: 03/12/19 17:32>





Medical Decision Making





- Medical Decision Making





03/11/19 19:05


Called Dr. Landis at 6:45pm and 7:00pm. Waiting for call back. 





Documentation prepared by Mendel Holland, acting as medical scribe for Napoleon Byrd MD





<Mendel Holland - Last Filed: 03/11/19 19:05>





- Medical Decision Making








03/11/19 19:25





Patient sent to the ED to rule out DVT. No DVT noted on ultrasound.





However patient with significant pain redness and swelling to legs starting 

last night which is almost 5 days postop my concern is for a postoperative 

cellulitis





Dr. Landis page 3 times awaiting discussion with orthopedic surgeon prior to 

initiation of antibiotics if no call back we'll start ceftriaxone and 

vancomycin and admit to hospitalist and consult orthopedist overnight.





 to follow up call back from Dr. Landis














<Napoleon Byrd - Last Filed: 03/12/19 17:32>





*DC/Admit/Observation/Transfer





<Mendel Holland - Last Filed: 03/11/19 19:05>





<Napoleon Byrd - Last Filed: 03/12/19 17:32>


Diagnosis at time of Disposition: 


 Cellulitis of right leg








- Discharge Dispostion


Disposition: HOME


Condition at time of disposition: Improved

## 2019-03-11 NOTE — PDOC
*Physical Exam





- Vital Signs


 Last Vital Signs











Temp Pulse Resp BP Pulse Ox


 


 98.5 F   88   20   148/84   98 


 


 03/11/19 16:10  03/11/19 16:10  03/11/19 16:10  03/11/19 16:10  03/11/19 16:10














ED Treatment Course





- LABORATORY


CBC & Chemistry Diagram: 


 03/11/19 17:13





 03/11/19 17:13





- ADDITIONAL ORDERS


Additional order review: 


 Laboratory  Results











  03/11/19 03/11/19 03/11/19





  17:13 17:13 17:13


 


PT with INR    13.2 H


 


INR    1.18


 


PTT (Actin FS)    26.7


 


Sodium   135 L 


 


Potassium   4.4 


 


Chloride   99 


 


Carbon Dioxide   25 


 


Anion Gap   11 


 


BUN   9 


 


Creatinine   0.6 


 


Creat Clearance w eGFR   > 60 


 


Random Glucose   99 


 


Calcium   8.6 


 


Total Bilirubin   0.9 


 


AST   24 


 


ALT   21 


 


Alkaline Phosphatase   77 


 


Troponin I  < 0.03  


 


Total Protein   6.6 


 


Albumin   3.3 L 


 


Urine Color   


 


Urine Appearance   


 


Urine pH   


 


Ur Specific Gravity   


 


Urine Protein   


 


Urine Glucose (UA)   


 


Urine Ketones   


 


Urine Blood   


 


Urine Nitrite   


 


Urine Bilirubin   


 


Urine Urobilinogen   


 


Ur Leukocyte Esterase   














  03/11/19





  16:30


 


PT with INR 


 


INR 


 


PTT (Actin FS) 


 


Sodium 


 


Potassium 


 


Chloride 


 


Carbon Dioxide 


 


Anion Gap 


 


BUN 


 


Creatinine 


 


Creat Clearance w eGFR 


 


Random Glucose 


 


Calcium 


 


Total Bilirubin 


 


AST 


 


ALT 


 


Alkaline Phosphatase 


 


Troponin I 


 


Total Protein 


 


Albumin 


 


Urine Color  Yellow


 


Urine Appearance  Clear


 


Urine pH  8.0


 


Ur Specific Gravity  1.010


 


Urine Protein  Negative


 


Urine Glucose (UA)  Negative


 


Urine Ketones  Negative


 


Urine Blood  Trace-intact H


 


Urine Nitrite  Negative


 


Urine Bilirubin  Negative


 


Urine Urobilinogen  0.2


 


Ur Leukocyte Esterase  Negative








 











  03/11/19





  17:13


 


RBC  3.80


 


MCV  89.2


 


MCHC  33.0


 


RDW  12.4


 


MPV  7.2 L


 


Neutrophils %  65.9


 


Lymphocytes %  19.3


 


Monocytes %  9.1


 


Eosinophils %  2.9


 


Basophils %  2.8 H














- Medications


Given in the ED: 


ED Medications














Discontinued Medications














Generic Name Dose Route Start Last Admin





  Trade Name Freq  PRN Reason Stop Dose Admin


 


Morphine Sulfate  4 mg  03/11/19 19:26  03/11/19 19:42





  Morphine Injection -  IVPUSH  03/11/19 19:27  4 mg





  ONCE ONE   Administration





     





     





     





     














Medical Decision Making





- Medical Decision Making





03/11/19 19:51


  Care of this patient received from .


service of  re-called: Dr Escobedo's cellular phone called with no answer


03/11/19 20:15


Dr Escobedo's PA called and she is informed of plan to admit for treatment of  

RLE cellulitis


03/11/19 21:07


The patient has been seen by  of [facility] service and 

will be admitted for evaluation and treatment of infectious process of the 

right lower extremity,s/p TKR











*DC/Admit/Observation/Transfer


Diagnosis at time of Disposition: 


 Cellulitis of right leg








- Discharge Dispostion


Condition at time of disposition: Guarded


Decision to Admit order: Yes





- Referrals





- Patient Instructions





- Post Discharge Activity

## 2019-03-11 NOTE — HP
CHIEF COMPLAINT: right knee pain 





PCP:





HISTORY OF PRESENT ILLNESS:


63yo woman with right knee replacement on 3/6/19, physical therapy at home 

afterwards, c/o pain, swelling, tenderness to right knee which started 3/10, 

seen in ortho clinic and advised to come to ER for further eval. Also reported 

some subjective fevers and chills. Denied any trauma. DVT was r/o. 








ER course was notable for:


(1) lower ext duplex scan 


(2) vancomycin 


(3)





Recent Travel:no





PAST MEDICAL HISTORY:  sleep apnea and rheumatoid arthritis





PAST SURGICAL HISTORY: right knee replacement- 3/6/19 





Social History:


Smoking: no 


Alcohol:no 


Drugs:  no 





Family History: extensive Hx of CAD


Allergies





shellfish derived Allergy (Severe, Verified 03/11/19 16:12)


 Rash


 ITCHING 


No Known Drug Intolerances Allergy (Verified 03/11/19 16:12)


 








HOME MEDICATIONS:


 Home Medications











 Medication  Instructions  Recorded


 


Esomeprazole Magnesium [Nexium 40 mg PO DAILY 02/20/19





24Hr]  


 


Hydrocodone/Acetaminophen 1 each PO ASDIR PRN 02/20/19





[Hydrocodone-Acetamin  mg]  


 


Ibuprofen/Famotidine [Duexis 1 each PO ASDIR PRN 02/20/19





800-26.6 mg Tablet]  


 


Rutin/Hesp/Bioflav/C/[id number] 1 each PO DAILY 02/20/19





[Bioflex Tablet]  


 


Aspirin [Aspirin EC] 81 mg PO DAILY 03/06/19


 


Cholecalciferol (Vitamin D3) 2,000 unit PO DAILY 03/06/19





[Vitamin D]  


 


Aspirin [Aspirin EC] 81 mg PO BID #84 tablet. 03/08/19


 


Docusate Sodium [Colace -] 100 mg PO TID PRN #90 capsule 03/08/19


 


Ferrous Sulfate 325 mg PO DAILY #30 tablet 03/08/19


 


Folic Acid 1 mg PO DAILY #30 tablet 03/08/19








REVIEW OF SYSTEMS


CONSTITUTIONAL: 


Absent:  , diaphoresis, generalized weakness, malaise, loss of appetite, weight 

change


present-fever, chills


HEENT: 


Absent:  rhinorrhea, nasal congestion, throat pain, throat swelling, difficulty 

swallowing, mouth swelling, ear pain, eye pain, visual changes


CARDIOVASCULAR: 


Absent: chest pain, syncope, palpitations, irregular heart rate, lightheadedness

, peripheral edema


RESPIRATORY: 


Absent: cough, shortness of breath, dyspnea with exertion, orthopnea, wheezing, 

stridor, hemoptysis


GASTROINTESTINAL:


Absent: abdominal pain, abdominal distension, nausea, vomiting, diarrhea, 

constipation, melena, hematochezia


GENITOURINARY: 


Absent: dysuria, frequency, urgency, hesitancy, hematuria, flank pain, genital 

pain


MUSCULOSKELETAL: 


Absent: myalgia, back pain, neck pain


present- arthralgia, joint swelling, 


SKIN: 


Absent: rash, itching, pallor


HEMATOLOGIC/IMMUNOLOGIC: 


Absent: easy bleeding, easy bruising, lymphadenopathy, frequent infections


ENDOCRINE:


Absent: unexplained weight gain, unexplained weight loss, heat intolerance, 

cold intolerance


NEUROLOGIC: 


Absent: headache, focal weakness or paresthesias, dizziness, seizure, mental 

status changes, bladder or bowel incontinence


present - unsteady gait


PSYCHIATRIC: 


Absent: anxiety, depression, suicidal or homicidal ideation, hallucinations.








PHYSICAL EXAMINATION


 Vital Signs - 24 hr











  03/11/19





  16:10


 


Temperature 98.5 F


 


Pulse Rate 88


 


Respiratory 20





Rate 


 


Blood Pressure 148/84


 


O2 Sat by Pulse 98





Oximetry (%) 











GENERAL: Awake, alert, and fully oriented, in no acute distress.


HEAD: Normal with no signs of trauma.


EYES: Pupils equal, round and reactive to light, extraocular movements intact, 

sclera anicteric, conjunctiva clear. No lid lag.


EARS, NOSE, THROAT: Ears normal, nares patent, oropharynx clear without 

exudates. Moist mucous membranes.


NECK: Normal range of motion, supple without lymphadenopathy, JVD, or masses.


LUNGS: Breath sounds equal, clear to auscultation bilaterally. No wheezes, and 

no crackles. No accessory muscle use.


HEART: Regular rate and rhythm, normal S1 and S2 without murmur, rub or gallop.


ABDOMEN: Soft, nontender, not distended, normoactive bowel sounds, no guarding, 

no rebound, no masses.  No hepatomegaly or  splenomegaly. 


MUSCULOSKELETAL: Normal range of motion at all joints. No bony deformities or 

tenderness. No CVA tenderness.


UPPER EXTREMITIES: 2+ pulses, warm, well-perfused. No cyanosis. No clubbing. No 

peripheral edema.


LOWER EXTREMITIES: 2+ pulses, warm, right knee surgical site warm to touch, 

inflammed, erythematous, tender  


NEUROLOGICAL:  Cranial nerves II-XII intact. Normal speech. Normal gait.


PSYCHIATRIC: Cooperative. Good eye contact. Appropriate mood and affect.


SKIN: right knee erythema 


 Laboratory Results - last 24 hr











  03/11/19 03/11/19 03/11/19





  16:30 17:13 17:13


 


WBC   6.7 


 


RBC   3.80 


 


Hgb   11.2 


 


Hct   33.9 


 


MCV   89.2 


 


MCH   29.5 


 


MCHC   33.0 


 


RDW   12.4 


 


Plt Count   375 


 


MPV   7.2 L 


 


Absolute Neuts (auto)   4.4 


 


Neutrophils %   65.9 


 


Lymphocytes %   19.3 


 


Monocytes %   9.1 


 


Eosinophils %   2.9 


 


Basophils %   2.8 H 


 


PT with INR    13.2 H


 


INR    1.18


 


PTT (Actin FS)    26.7


 


Sodium   


 


Potassium   


 


Chloride   


 


Carbon Dioxide   


 


Anion Gap   


 


BUN   


 


Creatinine   


 


Creat Clearance w eGFR   


 


Random Glucose   


 


Calcium   


 


Total Bilirubin   


 


AST   


 


ALT   


 


Alkaline Phosphatase   


 


Troponin I   


 


Total Protein   


 


Albumin   


 


Urine Color  Yellow  


 


Urine Appearance  Clear  


 


Urine pH  8.0  


 


Ur Specific Gravity  1.010  


 


Urine Protein  Negative  


 


Urine Glucose (UA)  Negative  


 


Urine Ketones  Negative  


 


Urine Blood  Trace-intact H  


 


Urine Nitrite  Negative  


 


Urine Bilirubin  Negative  


 


Urine Urobilinogen  0.2  


 


Ur Leukocyte Esterase  Negative  


 


Urine RBC  2-5  


 


Urine WBC  0-2  


 


Ur Epithelial Cells  Few  


 


Urine Bacteria  1+  














  03/11/19 03/11/19





  17:13 17:13


 


WBC  


 


RBC  


 


Hgb  


 


Hct  


 


MCV  


 


MCH  


 


MCHC  


 


RDW  


 


Plt Count  


 


MPV  


 


Absolute Neuts (auto)  


 


Neutrophils %  


 


Lymphocytes %  


 


Monocytes %  


 


Eosinophils %  


 


Basophils %  


 


PT with INR  


 


INR  


 


PTT (Actin FS)  


 


Sodium  135 L 


 


Potassium  4.4 


 


Chloride  99 


 


Carbon Dioxide  25 


 


Anion Gap  11 


 


BUN  9 


 


Creatinine  0.6 


 


Creat Clearance w eGFR  > 60 


 


Random Glucose  99 


 


Calcium  8.6 


 


Total Bilirubin  0.9 


 


AST  24 


 


ALT  21 


 


Alkaline Phosphatase  77 


 


Troponin I   < 0.03


 


Total Protein  6.6 


 


Albumin  3.3 L 


 


Urine Color  


 


Urine Appearance  


 


Urine pH  


 


Ur Specific Gravity  


 


Urine Protein  


 


Urine Glucose (UA)  


 


Urine Ketones  


 


Urine Blood  


 


Urine Nitrite  


 


Urine Bilirubin  


 


Urine Urobilinogen  


 


Ur Leukocyte Esterase  


 


Urine RBC  


 


Urine WBC  


 


Ur Epithelial Cells  


 


Urine Bacteria  








imaging studies reviewed 





ASSESSMENT/PLAN:


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 

evidenced by warmth, swelling, erythema, limited ROM. Less likely reasons 

included gout, rheumatoid arthritis, lyme arhritis, OA. 


-admit to med/surg 


-blood cultures x2 


-lactate 


-vancomycin 


-ceftriaxone 


-CT with and w/o contrast of right knee


-bed rest 


-fall precautions 


-morphine IV  prn if pain 


-zofran prn if nausea 


-PT consult 


-ID consult 


-ortho consult - arthrocentesis for further eval 





#ROSELIA


-CPAP 





-diet- low sodium 


-dvt ppx  - heparin sc 





Visit type





- Emergency Visit


Emergency Visit: Yes


Care time: The patient presented to the Emergency Department on the above date 

and was hospitalized for further evaluation of their emergent condition.





- New Patient


This patient is new to me today: Yes


Date on this admission: 03/11/19





- Critical Care


Critical Care patient: No

## 2019-03-12 VITALS — TEMPERATURE: 98.5 F | DIASTOLIC BLOOD PRESSURE: 72 MMHG | HEART RATE: 91 BPM | SYSTOLIC BLOOD PRESSURE: 132 MMHG

## 2019-03-12 LAB
ANION GAP SERPL CALC-SCNC: 9 MMOL/L (ref 8–16)
BUN SERPL-MCNC: 11 MG/DL (ref 7–18)
CALCIUM SERPL-MCNC: 8.4 MG/DL (ref 8.5–10)
CHLORIDE SERPL-SCNC: 102 MMOL/L (ref 98–107)
CO2 SERPL-SCNC: 25 MMOL/L (ref 21–32)
CREAT SERPL-MCNC: 0.5 MG/DL (ref 0.55–1.3)
DEPRECATED RDW RBC AUTO: 12.5 % (ref 11.6–15.6)
GLUCOSE SERPL-MCNC: 102 MG/DL (ref 74–106)
HCT VFR BLD CALC: 31.5 % (ref 32.4–45.2)
HGB BLD-MCNC: 10.3 GM/DL (ref 10.7–15.3)
MCH RBC QN AUTO: 29.4 PG (ref 25.7–33.7)
MCHC RBC AUTO-ENTMCNC: 32.8 G/DL (ref 32–36)
MCV RBC: 89.7 FL (ref 80–96)
PLATELET # BLD AUTO: 336 K/MM3 (ref 134–434)
PMV BLD: 7.3 FL (ref 7.5–11.1)
POTASSIUM SERPLBLD-SCNC: 4.3 MMOL/L (ref 3.5–5.1)
RBC # BLD AUTO: 3.51 M/MM3 (ref 3.6–5.2)
SODIUM SERPL-SCNC: 136 MMOL/L (ref 136–145)
WBC # BLD AUTO: 5.8 K/MM3 (ref 4–10.8)

## 2019-03-12 RX ADMIN — HEPARIN SODIUM SCH UNIT: 5000 INJECTION, SOLUTION INTRAVENOUS; SUBCUTANEOUS at 10:25

## 2019-03-12 NOTE — DS
Physical Exam: 


SUBJECTIVE: Patient seen and examined








OBJECTIVE:





 Vital Signs











 Period  Temp  Pulse  Resp  BP Sys/Jain  Pulse Ox


 


 Last 24 Hr  98.4 F-98.8 F  77-96  18-20  108-148/60-84  95-98








PHYSICAL EXAM





GENERAL: The patient is awake, alert, and fully oriented, in no acute distress.


HEAD: Normal with no signs of trauma.


EYES: PERRL, extraocular movements intact, sclera anicteric, conjunctiva clear. 


ENT: Ears normal, nares patent, oropharynx clear without exudates, moist mucous 

membranes.


NECK: Trachea midline, full range of motion, supple. 


LUNGS: Breath sounds equal, clear to auscultation bilaterally, no wheezes, no 

crackles, no accessory muscle use. 


HEART: Regular rate and rhythm, S1, S2 without murmur, rub or gallop.


ABDOMEN: Soft, nontender, nondistended, normoactive bowel sounds, no guarding, 

no rebound, no hepatosplenomegaly, no masses.


EXTREMITIES: 2+ pulses, warm, well-perfused, no edema. 


NEUROLOGICAL: Cranial nerves II through XII grossly intact. Normal speech, gait 

not observed.


PSYCH: Normal mood, normal affect.


SKIN: Warm, dry, normal turgor, no rashes or lesions noted.





LABS


 Laboratory Results - last 24 hr











  03/11/19 03/11/19 03/11/19





  16:30 17:13 17:13


 


WBC   6.7 


 


RBC   3.80 


 


Hgb   11.2 


 


Hct   33.9 


 


MCV   89.2 


 


MCH   29.5 


 


MCHC   33.0 


 


RDW   12.4 


 


Plt Count   375 


 


MPV   7.2 L 


 


Absolute Neuts (auto)   4.4 


 


Neutrophils %   65.9 


 


Lymphocytes %   19.3 


 


Monocytes %   9.1 


 


Eosinophils %   2.9 


 


Basophils %   2.8 H 


 


PT with INR    13.2 H


 


INR    1.18


 


PTT (Actin FS)    26.7


 


Sodium   


 


Potassium   


 


Chloride   


 


Carbon Dioxide   


 


Anion Gap   


 


BUN   


 


Creatinine   


 


Creat Clearance w eGFR   


 


Random Glucose   


 


Lactic Acid   


 


Calcium   


 


Total Bilirubin   


 


AST   


 


ALT   


 


Alkaline Phosphatase   


 


Troponin I   


 


Total Protein   


 


Albumin   


 


Urine Color  Yellow  


 


Urine Appearance  Clear  


 


Urine pH  8.0  


 


Ur Specific Gravity  1.010  


 


Urine Protein  Negative  


 


Urine Glucose (UA)  Negative  


 


Urine Ketones  Negative  


 


Urine Blood  Trace-intact H  


 


Urine Nitrite  Negative  


 


Urine Bilirubin  Negative  


 


Urine Urobilinogen  0.2  


 


Ur Leukocyte Esterase  Negative  


 


Urine RBC  2-5  


 


Urine WBC  0-2  


 


Ur Epithelial Cells  Few  


 


Urine Bacteria  1+  














  03/11/19 03/11/19 03/11/19





  17:13 17:13 20:55


 


WBC   


 


RBC   


 


Hgb   


 


Hct   


 


MCV   


 


MCH   


 


MCHC   


 


RDW   


 


Plt Count   


 


MPV   


 


Absolute Neuts (auto)   


 


Neutrophils %   


 


Lymphocytes %   


 


Monocytes %   


 


Eosinophils %   


 


Basophils %   


 


PT with INR   


 


INR   


 


PTT (Actin FS)   


 


Sodium  135 L  


 


Potassium  4.4  


 


Chloride  99  


 


Carbon Dioxide  25  


 


Anion Gap  11  


 


BUN  9  


 


Creatinine  0.6  


 


Creat Clearance w eGFR  > 60  


 


Random Glucose  99  


 


Lactic Acid    2.4 H*


 


Calcium  8.6  


 


Total Bilirubin  0.9  


 


AST  24  


 


ALT  21  


 


Alkaline Phosphatase  77  


 


Troponin I   < 0.03 


 


Total Protein  6.6  


 


Albumin  3.3 L  


 


Urine Color   


 


Urine Appearance   


 


Urine pH   


 


Ur Specific Gravity   


 


Urine Protein   


 


Urine Glucose (UA)   


 


Urine Ketones   


 


Urine Blood   


 


Urine Nitrite   


 


Urine Bilirubin   


 


Urine Urobilinogen   


 


Ur Leukocyte Esterase   


 


Urine RBC   


 


Urine WBC   


 


Ur Epithelial Cells   


 


Urine Bacteria   














  03/12/19 03/12/19 03/12/19





  07:10 07:10 07:10


 


WBC  5.8  


 


RBC  3.51 L  


 


Hgb  10.3 L  


 


Hct  31.5 L  


 


MCV  89.7  


 


MCH  29.4  


 


MCHC  32.8  


 


RDW  12.5  


 


Plt Count  336  


 


MPV  7.3 L  


 


Absolute Neuts (auto)   


 


Neutrophils %   


 


Lymphocytes %   


 


Monocytes %   


 


Eosinophils %   


 


Basophils %   


 


PT with INR   


 


INR   


 


PTT (Actin FS)   


 


Sodium   136 


 


Potassium   4.3 


 


Chloride   102 


 


Carbon Dioxide   25 


 


Anion Gap   9 


 


BUN   11 


 


Creatinine   0.5 L 


 


Creat Clearance w eGFR   > 60 


 


Random Glucose   102 


 


Lactic Acid    1.0


 


Calcium   8.4 L 


 


Total Bilirubin   


 


AST   


 


ALT   


 


Alkaline Phosphatase   


 


Troponin I   


 


Total Protein   


 


Albumin   


 


Urine Color   


 


Urine Appearance   


 


Urine pH   


 


Ur Specific Gravity   


 


Urine Protein   


 


Urine Glucose (UA)   


 


Urine Ketones   


 


Urine Blood   


 


Urine Nitrite   


 


Urine Bilirubin   


 


Urine Urobilinogen   


 


Ur Leukocyte Esterase   


 


Urine RBC   


 


Urine WBC   


 


Ur Epithelial Cells   


 


Urine Bacteria   











HOSPITAL COURSE:





Date of Admission:03/11/19





Date of Discharge: 03/12/19














Discharge Summary


Reason For Visit: CELLULITIS OF RIGHT LOWER EXTREMITY


Current Active Problems





Cellulitis of right leg (Acute)








Condition: Improved





- Instructions


Referrals: 


Sherwin Landis MD [Primary Care Provider] - 


Disposition: HOME





- Home Medications


Comprehensive Discharge Medication List: 


Ambulatory Orders





Esomeprazole Magnesium [Nexium 24Hr] 40 mg PO DAILY 02/20/19 


Ibuprofen/Famotidine [Duexis 800-26.6 mg Tablet] 1 each PO ASDIR PRN 02/20/19 


Rutin/Hesp/Bioflav/C/Etiwge087 [Bioflex Tablet] 1 each PO DAILY 02/20/19 


Cholecalciferol (Vitamin D3) [Vitamin D3] 2,000 unit PO DAILY 03/06/19 


Aspirin [Aspirin EC] 81 mg PO BID #84 tablet. 03/08/19 


Docusate Sodium [Colace -] 100 mg PO TID PRN #90 capsule 03/08/19 


Ferrous Sulfate 325 mg PO DAILY #30 tablet 03/08/19 


Folic Acid 1 mg PO DAILY #30 tablet 03/08/19

## 2019-03-12 NOTE — CONSULT
- Consultation


REQUESTING PROVIDER: Sherwin Landis - Orthopaedic Surgeon





CONSULT REQUEST: We have been asked to surgically evaluate this patient for 

questionable septic knee s/p Right TKA





PCP: Nettie Joel





HPI: 65yo female with PMHx as noted below. Comes to [facility] for 

further evaluation of her Left knee with concerns for infection. Patient is 

well known to our service as she is POD #6 s/p Right TKA w/ Dr. Sherwin Landis. 

She was dc'd home on 3/8. Going to PT as instructed. States she noticed that 

her RLE has increased in size and very warm to the touch. States she went to 

Dr. Landis's office for evaluation. The JUMP-START DRESSING (which shouldn't 

have been removed until her scheduled post-op visit) was removed by NP in his 

practice. Due to the swelling, they sent her to [facility] for further evaluation. 

While in the ED the performed a LE duplex and ruled out DVT. Her WBC in ED was 

6.7. Afeb. She also received Rocephin and Vanco while in ED. Per ED notes there 

may have been a subjective temp at home but when I asked the patient she states 

she never felt warm or chills. Denies n/v, CP, Palpitations, SOB or WIN. Denies 

Dysuria or hematuria.





PMHx:  Sleep Apnea, RA, Obesity        





PSHx: As above.       





Home Meds


Nexium 40 mg PO daily


Hydrocodone  mg. 1 tab PO ASDIR


Ibuprofen 1 tab PO ASDIR


ASA 81 mg PO BID


Colace 100 mg PO TID


Ferrous Sulfate 325 mg PO daily


Folic Acid 1 mg PO daily


 


Allergies


NKDA


Shellfish (rash)





ROS:


CONSTITUTIONAL: Absent:  diaphoresis, generalized weakness, malaise, loss of 

appetite, weight change


CARDIOVASCULAR: Absent: syncope,irregular heart rate, lightheadedness


RESPIRATORY: Absent: cough, wheezing, stridor, hemoptysis


GASTROINTESTINAL:Absent: abdominal pain, abdominal distension, constipation, 

melena, hematochezia


GENITOURINARY: Absent: frequency, urgency, hesitancy, hematuria, flank pain, 

genital pain


MUSCULOSKELETAL: Absent: myalgia, arthralgia, joint swelling, back pain, neck 

pain


SKIN: Absent: rash, itching, pallor


HEMATOLOGIC/IMMUNOLOGIC: Absent: easy bleeding, easy bruising, lymphadenopathy


NEUROLOGIC: Absent: headache, focal weakness, dizziness, unsteady gait, seizure

, mental status changes, 


PSYCHIATRIC: Absent: anxiety, depression, suicidal or homicidal ideation, 

hallucinations.





PE:


GENERAL: Awake, alert, and fully oriented, in no acute distress.


HEAD: Normal with no signs of trauma.


EYES: PERRL, sclera anicteric, conjunctiva clear.


NECK: Normal ROM, supple without lymphadenopathy, JVD, or masses.


LUNGS: CTA bilat


HEART: RRR


ABDOMEN: Obese. Soft, NT, ND, normoactive bowel sounds 


MUSCULOSKELETAL: No CVA tenderness bilat


UE: 2+ pulses, warm, well-perfused. No cyanosis. Cap refill <2 seconds. No 

peripheral edema.


LE: LLE unremakarble. RLE with steri strips intact. Incision underneath is well 

approximated. No evidence of infection (no purulent drainage, no soft tissue


     creptis). Mild erythema (to be expected). Feet are warm bilat. Hard to 

asses DP secondary to body habitus. No calf tenderness.  


NEUROLOGICAL: Normal speech, gait not observed.








 Last Vital Signs











Temp Pulse Resp BP Pulse Ox


 


 98.4 F   95 H  18   142/81   98 


 


 03/12/19 06:24  03/12/19 06:24  03/12/19 06:24  03/12/19 06:24  03/12/19 02:06








 CBC TREND











  03/11/19 03/12/19





  17:13 07:10


 


WBC  6.7  Pending


 


Hgb  11.2  Pending


 


Hct  33.9  Pending


 


Plt Count  375  Pending








 Urine Test Results











Urine Color  Yellow   03/11/19  16:30    


 


Urine Appearance  Clear   03/11/19  16:30    


 


Urine pH  8.0  (4.5-8)   03/11/19  16:30    


 


Ur Specific Gravity  1.010  (1.010-1.035)   03/11/19  16:30    


 


Urine Protein  Negative  (NEGATIVE)   03/11/19  16:30    


 


Urine Glucose (UA)  Negative  (NEGATIVE)   03/11/19  16:30    


 


Urine Ketones  Negative  (NEGATIVE)   03/11/19  16:30    


 


Urine Blood  Trace-intact  (NEGATIVE)  H  03/11/19  16:30    


 


Urine Nitrite  Negative  (NEGATIVE)   03/11/19  16:30    


 


Urine Bilirubin  Negative  (NEGATIVE)   03/11/19  16:30    


 


Ur Leukocyte Esterase  Negative  (NEGATIVE)   03/11/19  16:30    


 


Urine RBC  2-5 /hpf (0-3)   03/11/19  16:30    


 


Urine WBC  0-2  (0-5)   03/11/19  16:30    


 


Ur Epithelial Cells  Few /HPF  03/11/19  16:30    


 


Urine Bacteria  1+ /hpf (NEGATIVE)   03/11/19  16:30    








 INR, PTT











INR  1.18  (0.82-1.09)   03/11/19  17:13    














Problem List





- Problems


(1) S/P total knee arthroplasty


Assessment/Plan: 





63 yo female  POD #6 s/p Right TKA. Patient was sent in for possible DVT vs 

septic knee. No evidence of septic knee or DVT. Patient remains afebrile. No 

leukocytosis. AVSS. The swelling and mild warmth is to be expected after this 

type of surgery. Body undergoes an inflammatory reaction after surgery. Patient 

is instructed to f/u with Dr. Landis in 1 week for a post-op check. Until then:





Cont with you ASA 81 mg PO BID


Light ace compression


RLE elevation while seated in chair


ICE after each PT session 


WBAT RLE


Out-patient PT


Pain management as previously prescribed


No surgical intervention warranted.


Patient can be dc'd home today.


Above plan discussed with Dr. Landis and agrees.





 


Code(s): Z96.659 - PRESENCE OF UNSPECIFIED ARTIFICIAL KNEE JOINT   





Visit type





- Case Type


Case Type: ED Admission





- Emergency


Emergency Visit: Yes


ED Registration Date: 03/11/19


Care time: The patient presented to the Emergency Department on the above date 

and was hospitalized for further evaluation of their emergent condition.





- New patient


This patient is new to me today: Yes


Date on this admission: 03/12/19

## 2019-03-12 NOTE — EKG
Test Reason : 

Blood Pressure : ***/*** mmHG

Vent. Rate : 093 BPM     Atrial Rate : 093 BPM

   P-R Int : 122 ms          QRS Dur : 076 ms

    QT Int : 352 ms       P-R-T Axes : 002 010 039 degrees

   QTc Int : 437 ms

 

NORMAL SINUS RHYTHM

NORMAL ECG

NO PREVIOUS ECGS AVAILABLE

Confirmed by MD MIGUEL A, MARCELLE ([id number]) on 3/12/2019 1:36:16 PM

 

Referred By:             Confirmed By:MARCELLE GRADY MD